# Patient Record
Sex: FEMALE | URBAN - METROPOLITAN AREA
[De-identification: names, ages, dates, MRNs, and addresses within clinical notes are randomized per-mention and may not be internally consistent; named-entity substitution may affect disease eponyms.]

---

## 2021-05-26 ENCOUNTER — TELEPHONE (OUTPATIENT)
Dept: SURGERY | Age: 56
End: 2021-05-26

## 2021-05-26 NOTE — PROGRESS NOTES
Breast Cancer Consult      Ms. Bonifacio Hinkle is a 63year old woman who was referred for right cT1cN1 ER weak positive (10% weak), [place] negative, HER positive  breast cancer, s/p core biopsy of breast and axilla 5/19/21. The area of concern was identified on screening imaging. She denied palpable mass. She denied nipple discharge. She denied breast pain. There is family history of breast cancer in her mother in her 76s. Her most recent previous mammogram was 2017. Breast/GYN history:    OB History    No obstetric history on file. No past medical history on file. Past Surgical History:   Procedure Laterality Date    HX OTHER SURGICAL Right     moles removed from thigh       No current outpatient medications on file prior to visit. No current facility-administered medications on file prior to visit.        No Known Allergies    Social History     Tobacco Use    Smoking status: Never Smoker    Smokeless tobacco: Never Used   Substance Use Topics    Alcohol use: Not on file    Drug use: Not on file       Family History   Problem Relation Age of Onset    Breast Cancer Mother          ROS: positives are bolded  General: fevers, chills, night sweats, fatigue, weight loss, weight gain  GI: nausea, vomiting, abdominal pain, change in bowel habits, hematochezia, melena, GERD  Integ: dermatitis, abnormal moles  HEENT: visual changes, vertigo, epistaxis, dysphagia, hoarseness  Cardiac: chest pain, palpitations, HTN, edema, varicosities  Resp: cough, shortness of breath, wheezing, hemoptysis, snoring, reactive airway disease  : hematuria, dysuria, frequency, urgency, nocturia, stress urinary incontinence   MSK: weakness, joint pain, arthritis  Endocrine:  thyroid disease, polyuria, polydipsia, polyphagia, poor wound healing, heat intolerance, cold intolerance  Lymph/Heme: anemia, bruising, history of blood transfusions  Neuro: dizziness, headache, fainting, seizures, stroke  Psych: anxiety, depression    Physical Exam:  Visit Vitals  /83   Pulse 63   Temp 97.8 °F (36.6 °C) (Skin)   Resp 20   Ht 5' 7\" (1.702 m)   Wt 76.2 kg (168 lb)   BMI 26.31 kg/m²       Gen:  No distress  Head: normocephalic, atraumatic  Mouth: Clear, no overt lesions, oral mucosa pink and moist  Neck: supple, no masses, no adenopathy, trachea midline  Resp: clear bilaterally  Cardio: Regular rate and rhythm  Abdomen: soft, nontender, nondistended  Extremities: warm, well-perfused  Neuro: sensation and strength grossly intact and symmetrical  Psych: alert and oriented to person, place and time  Breasts:   Right: Examined in both the supine and upright positions. There was no supraclavicular, infraclavicular, or axillary lymphadenopathy. There were no dominant masses, no skin changes, no asymmetry identified core biopsy wounds clean, nodularity lateral breast, residual mass vs hematoma, fullness right axilla node vs hematoma  Left: Examined in both the supine and upright positions. There was no supraclavicular, infraclavicular, or axillary lymphadenopathy. There were no dominant masses, no skin changes, no asymmetry identified       Imagin21 right mammogram/ultrasound ([facility])  BIRADS 4    21 bilateral mammogram ([facility])  BIRADS 0    Pathology:  21 [facility]  A.  RIGHT BREAST, 9:00 6 CM FROM THE NIPPLE, BIOPSY:  - INVASIVE CARCINOMA (DUCTAL) WITH EXTENSIVE ASSOCIATED LYMPHOCYTES, GRADE 23.  - ESTROGEN RECEPTOR:  WEAK POSITIVE  (10%, WEAK  STAINING)  - PROGESTERONE RECEPTOR:  NEGATIVE (LESS THAN 1% WEAK  STAINING)  - HER2:  POSITIVE (3+).  HER2 FISH TESTING HAS BEEN ORDERED AND WILL BE           RESULTED IN A SEPARATE REPORT.     B.  LYMPH NODE, RIGHT AXILLARY, BIOPSY:  INVASIVE CARCINOMA (DUCTAL) WITH EXTENSIVE ASSOCIATED LYMPHOCYTES, HISTOLOGICALLY SIMILAR TO THAT SEEN IN PART A, SEE COMMENT.          COMMENT:   -The carcinomas in part a and part B are histologically similar and similarly show extensive associated lymphocytes. Specimen B may represent a lymph node metastasis however histologic definitive features of a lymph node are not seen. Impression:  Patient Active Problem List   Diagnosis Code    Breast cancer of lower-outer quadrant of right female breast [facility]) C50.36         63year old woman who was referred for right cT1cN1 ER weak positive (10% weak), [place] negative, HER positive  breast cancer, s/p core biopsy of breast and axilla 5/19/21. We discussed that there are many options for treatment of breast cancer. Surgery, chemotherapy, radiation and hormone therapy are all tools that may be used in the treatment of breast cancer. For each patient, we determine which will be most beneficial based on her individual set of circumstances. For some patients all four treatment categories will be recommended. For others one or more of these options are not appropriate. We began by reviewing her imaging thus far as well as pathology in detail. Chemotherapy was addressed. Often chemotherapy is administered after surgery, however due to receptor status and node positivity Ms. Trisha Garber may benefit from neoadjuvant chemotherapy or treatment prior to surgery. Chemotherapy is systemic treatment aimed largely to decrease chance of spread or recurrence of cancer. It may also decrease the size of her cancer to facilitate surgery. It is administered by a medical oncologist.  I have recommended referral to medical oncology for additional information regarding chemotherapy. Chemotherapy is generally administered via port. Regarding surgery, there are two main options, lumpectomy or mastectomy. We discussed both in detail. Overall survival and distant recurrence rates are the same. The decision is generally a personal decision more so than a medical one. Lumpectomy is also known as breast conservation surgery or partial mastectomy.  The goal is to remove the area of concern as well as surrounding area of uninvolved tissue (\"clear margins\"). Radiation is almost always recommended with lumpectomy to allow for acceptable local recurrence rates. Local recurrence rates are approximately 6% after lumpectomy with radiation. Risks, benefits and options were discussed in detail to include, but not limited to, bleeding, infection, risks of anesthesia, injury to surrounding structures and other unforeseen events such as stroke, heart attack or death. Mastectomy was then addressed. With mastectomy, almost all of the breast tissue is removed. We are not able to remove 100% of the breast tissue. The risk of local recurrence is approximately 2-4% after mastectomy. The overlying skin is generally numb. Most often, the numbness is permanent. Mastectomy can be performed with or without reconstruction. The reconstruction is performed by the plastic surgeon. Commonly it is a multi-step process with placement of tissue expanders as the first step. If she is interested in reconstruction, I will refer her to plastic surgery. Often we are able to perform a nipple sparing mastectomy with reconstruction. The reconstructed breast differs in many ways from the native breast.  The goal is that in a bra or clothing, no one can tell she has had a mastectomy. Radiation generally is not needed after mastectomy. There are some circumstances, usually based on size, margins, local extension or lymph node status, where post-mastectomy radiation is recommended. Risks, benefits and options were discussed in detail to include, but not limited to, bleeding, infection, risks of anesthesia, injury to surrounding structures and other unforeseen events such as stroke, heart attack or death. Routine screening mammogram is not recommended after mastectomy. As she is clinically node positive, she is not currently a candidate for sentinel node biopsy.   Should she have an excellent response to neoadjuvant chemotherapy, she may become a candidate for sentinel node biopsy. We discussed this procedure is a targeted sampling of the axillary lymph nodes to allow for staging of her disease. She will be injected with radioactive isotope as well as blue dye to allow for mapping and removal of the sentinel nodes. By removing only the sentinel nodes and not performing axillary node dissection, she has a decreased risk of lymphedema (arm swelling) and nerve injury. We did specifically discuss that both of these risks still exist.   Risks, benefits and options were discussed in detail to include, but not limited to, bleeding, infection, risks of anesthesia, injury to surrounding structures and other unforeseen events such as stroke, heart attack or death. We discussed radiation. Radiation is a local therapy aimed to decrease the chance of local recurrence. It is administered under the direction of a radiation oncologist.  Radiation is almost always recommended with lumpectomy. It generally is not needed after mastectomy. There are some circumstances, usually based on size, margins, local extension or lymph node status, where post-mastectomy radiation is recommended. Most commonly it is administered five days a week for up to seven weeks. Most of the side effects, with the exception of fatigue, are local.      Anti-hormone or hormone blocking therapy is used in hormone sensitive, estrogen receptor positive breast cancer. It is generally recommended for 5-10 years. There are two categories of hormone blocking medications. Tamoxifen is a selective estrogen reuptake modulator (SERM). There are several aromatase inhibitors. These medications are generally prescribed by a medical oncologist.      Staging work up may be indicated for Ms. Garber. I will defer to medical oncology.   We discussed that if cancer is noted outside of the breast or axilla (under arm), goals of care change from cure to palliation. After discussing the above, Ms. Ermias Berry prefers right partial mastectomy, with reassessment of axilla to see if she may be a candidate for sentinel node biopsy after neoadjuvant therapy. We will schedule medical oncology consultation. All questions were answered. She was asked to call with any additional questions or concerns.

## 2021-05-27 ENCOUNTER — OFFICE VISIT (OUTPATIENT)
Dept: SURGERY | Age: 56
End: 2021-05-27
Payer: COMMERCIAL

## 2021-05-27 ENCOUNTER — NURSE NAVIGATOR (OUTPATIENT)
Dept: OTHER | Age: 56
End: 2021-05-27

## 2021-05-27 VITALS
WEIGHT: 168 LBS | DIASTOLIC BLOOD PRESSURE: 83 MMHG | HEIGHT: 67 IN | TEMPERATURE: 97.8 F | BODY MASS INDEX: 26.37 KG/M2 | SYSTOLIC BLOOD PRESSURE: 127 MMHG | RESPIRATION RATE: 20 BRPM | HEART RATE: 63 BPM

## 2021-05-27 DIAGNOSIS — C50.511 MALIGNANT NEOPLASM OF LOWER-OUTER QUADRANT OF RIGHT BREAST OF FEMALE, ESTROGEN RECEPTOR POSITIVE (HCC): Primary | ICD-10-CM

## 2021-05-27 DIAGNOSIS — C50.511 MALIGNANT NEOPLASM OF LOWER-OUTER QUADRANT OF RIGHT BREAST OF FEMALE, ESTROGEN RECEPTOR POSITIVE (HCC): ICD-10-CM

## 2021-05-27 DIAGNOSIS — Z17.0 MALIGNANT NEOPLASM OF LOWER-OUTER QUADRANT OF RIGHT BREAST OF FEMALE, ESTROGEN RECEPTOR POSITIVE (HCC): Primary | ICD-10-CM

## 2021-05-27 DIAGNOSIS — Z17.0 MALIGNANT NEOPLASM OF LOWER-OUTER QUADRANT OF RIGHT BREAST OF FEMALE, ESTROGEN RECEPTOR POSITIVE (HCC): ICD-10-CM

## 2021-05-27 PROCEDURE — 99205 OFFICE O/P NEW HI 60 MIN: CPT | Performed by: SURGERY

## 2021-05-27 NOTE — LETTER
5/27/2021 10:08 AM 
 
Patient:  Lynn Rivera YOB: 1965 Date of Visit: 5/27/2021 Fitz Awad MD 
[address] [address] [address] [place], [place] [id number] Via Fax: [phone] Dear Fitz Awad MD, 
 
 
I had the pleasure of seeing Ms. Trisha Garber in my office today for her newly diagnosed breast cancer. I am including a copy of my office visit today. If you have questions, please do not hesitate to call me. I look forward to following Ms. Garber along with you and will keep you updated as to her progress. Sincerely, Esme Celis MD

## 2021-05-27 NOTE — NURSE NAVIGATOR
Initial assessment for Trisha Garber, newly diagnosed with HER 2 positive breast cancer with mets to axillary lymph nodes. Meeting with pt included pt only. Patient was assessed for the following barriers:    Communication:       Yes    No  -Ability to read/write,understand [place]       [x]      []    -Ability to talk with family/children,friends about diagnosis     [x]      []    -Other:  Comments/Referrals/Services provided: Pt lives with 2 adult children-she is hesitant at this time about discussing diagnosis with then doesn't want to burden them. Referred to -[facility]. Employment/Financial/Legal:     Yes    No  -Loss of employment/income         []      [x]    -Insurance coverage          [x]      []     -Needs help applying for Social Security/Disability/FMLA     []      [x]     -Help with Co-Pays for office visits         [x]      []    -Medication assistance/medical supplies or equipment     []      [x]    -Difficulty paying bills: utilities/housing       []      [x]    -Means to buy food                     [x]      []    -Legal issues           []      [x]    -Other:  Comments/Referrals/Services provided:  Pt is employed at the [employer] as a [place] . She has insurance with [employer]. She is concerned about co-payment, informed pt to apply for financial assistance with [facility] as she will be having chemo with them. No other financial concerns at this time. Psychosocial        Yes    No  Housing:  -Homeless           []      [x]    -Extended care needs: long term care/home care/Hospice     []      [x]    -Other:  Comments/Referrals/Services provided: Pt owns home. Transportation:       Yes    No  -Lack of vehicle or public transportation options      []      [x]    -Funds need for public transportation: bus/taxi      []      [x]    -Other:  Comments/Referrals/Services provided: Pt drives self to appointments.   Support system:       Yes No  -Family members at home: spouse/significant other/children    [x]      []    -Has a support system         []      []    Other:  Comments/Referrals/Services provided: Pt lives at home with 2 adult children-states that she doesn't want to burden children with diagnosis. Referred to . Spirituality:        Yes No  -Spiritual issues          []      [x]    -Cultural concerns          []      [x]    -Other:  -Comments/Referrals/Services provided: No concerns today. Sexuality:        Yes No  -Body image concerns                     []      [x]    -Relationships/significant other issues        []      [x]    -Other:  Comments/Referrals/Services provided: No concerns today. Coping:        Yes    No  -Able to manage emotions         [x]      []    -Feeling fearful or anxious         [x]      []    -Interest in attending support groups        []      [x]    -Cancer related pain/control         []      [x]    -Other:  Comments/Referrals/Services provided: Pt is calm today. Tobacco dependency:      Yes No  -Currently smokes: cigarettes/cigars        []      [x]    -Uses smokeless tobacco         []      [x]    -Other:  Comments/Referrals/Services provided: Denies smoking. Education/review of Disease process and Management   Yes No  -Explanation of navigator role/contact information      [x]      []    New Patient Guide for Breast Cancer provided                      [x]     []         -Pathology/Staging          [x]      []    -Diagnostic tests          [x]      []    -Genetic testing needed         []      [x]    -Treatment options/plan: surgery/chemotherapy/radiation     [x]      []    -Mediport placement as needed                              [x]      []    -Tobacco cessation as needed        []      [x]    -Need for a second opinion         []      [x]    -Importance of bringing family/friends to medical appts     [x]      []   -Other:  Patient/family verbalized understanding of treatment plan: Yes.  Dr. Estefani Handy discussed breast cancer treatment options with pt. After discussion pt was referred to med/onc-Mandeep Chance to discuss neoadjuvant chemo for HER 2 positive breast cancer. Metastatic work up [employer] lymph node positive. [facility] will schedule appointments. Gift bag and breast guide given to patient. NCCN Distress Tool    Trisha Marrow was assessed for management of distress using the NCCN Distress Thermometer for Patients tool. Mild to moderate distress  Scorin-4        Yes    No    -Practical/physical issues       []      [x]      -Provide community resources      [x]      []      -Provide list of support groups      [x]      []      -Provide list of national organizations and websites               [x]      []      -Provide ongoing supportive care by oncology medical team        [x]      []                  Comments/Referrals/Services provided:  Yes. Score- 2    Moderate to severe distress  Scorin or greater                   Yes    No    -Navigator consulted with MD      []      []     -Navigator follow up         []      []     -Spiritual concerns        []      []     -Emotional/family concerns       []      []     -Refer to /mental health professional/PCP   []      []      Comments/Referral/Services provided:  Yes.

## 2021-06-01 ENCOUNTER — DOCUMENTATION ONLY (OUTPATIENT)
Age: 56
End: 2021-06-01

## 2021-06-01 NOTE — PROGRESS NOTES
Oncology Social Work Note   Patient name: Yoshi Nurse   MRN: [id number]   YOB: 1965     06/01/2021    MSW received notification from nurse navigator- patient is a newly diagnosed breast cancer patient. Patient assessed found to have no psychosocial needs at this time. Patient provided with information on role of MSW and support services provided by MSW. MSW will provide support, assistance and information as needed and/or requested. Zulma Baptiste, ALONSO, LMSW

## 2021-06-04 ENCOUNTER — NURSE NAVIGATOR (OUTPATIENT)
Dept: OTHER | Age: 56
End: 2021-06-04

## 2021-06-04 NOTE — NURSE NAVIGATOR
Attempted to call pt on numerous occasions, no answer. Noted in pt's chart that she contacted nurse navigator Arnie Haro at [facility] and requested to stay within the [facility] system. Patient has a  Surgical consult with Connor Sanderson/Dr. Karl Kapadia on 6/10/21.

## 2021-06-11 ENCOUNTER — TELEPHONE (OUTPATIENT)
Dept: SURGERY | Age: 56
End: 2021-06-11

## 2021-06-11 NOTE — TELEPHONE ENCOUNTER
Received call from nurse at Dr. Leslie Dakins office to notify us that so far they have been unsuccessful in contacting this patient to schedule an appointment. Informed her that per notes patient was requesting to stay within the [facility] system. Patient had a surgical consult with Connor Sanderson/Dr. Ran Morris on 6/10/21. Caller was able to provide me with an alternate phone number of [phone].

## 2021-06-11 NOTE — TELEPHONE ENCOUNTER
Dr. Vibha Elam office was able to reach Ms. Katerina Bobo, who has declined to consult with their office. Patient wants care to remain within the [facility] system. Will notify Dr. Naya Colón.